# Patient Record
Sex: MALE | Race: WHITE | NOT HISPANIC OR LATINO | URBAN - METROPOLITAN AREA
[De-identification: names, ages, dates, MRNs, and addresses within clinical notes are randomized per-mention and may not be internally consistent; named-entity substitution may affect disease eponyms.]

---

## 2019-09-27 ENCOUNTER — APPOINTMENT (RX ONLY)
Dept: URBAN - METROPOLITAN AREA CLINIC 161 | Facility: CLINIC | Age: 59
Setting detail: DERMATOLOGY
End: 2019-09-27

## 2019-09-27 DIAGNOSIS — L81.4 OTHER MELANIN HYPERPIGMENTATION: ICD-10-CM

## 2019-09-27 DIAGNOSIS — L29.89 OTHER PRURITUS: ICD-10-CM

## 2019-09-27 DIAGNOSIS — L29.8 OTHER PRURITUS: ICD-10-CM

## 2019-09-27 DIAGNOSIS — L81.5 LEUKODERMA, NOT ELSEWHERE CLASSIFIED: ICD-10-CM

## 2019-09-27 DIAGNOSIS — D18.0 HEMANGIOMA: ICD-10-CM

## 2019-09-27 DIAGNOSIS — L81.0 POSTINFLAMMATORY HYPERPIGMENTATION: ICD-10-CM

## 2019-09-27 DIAGNOSIS — L72.0 EPIDERMAL CYST: ICD-10-CM

## 2019-09-27 DIAGNOSIS — D22 MELANOCYTIC NEVI: ICD-10-CM

## 2019-09-27 DIAGNOSIS — L85.3 XEROSIS CUTIS: ICD-10-CM

## 2019-09-27 DIAGNOSIS — Z71.89 OTHER SPECIFIED COUNSELING: ICD-10-CM

## 2019-09-27 DIAGNOSIS — L28.1 PRURIGO NODULARIS: ICD-10-CM

## 2019-09-27 DIAGNOSIS — I78.8 OTHER DISEASES OF CAPILLARIES: ICD-10-CM

## 2019-09-27 DIAGNOSIS — L73.8 OTHER SPECIFIED FOLLICULAR DISORDERS: ICD-10-CM

## 2019-09-27 PROBLEM — H91.90 UNSPECIFIED HEARING LOSS, UNSPECIFIED EAR: Status: ACTIVE | Noted: 2019-09-27

## 2019-09-27 PROBLEM — D18.01 HEMANGIOMA OF SKIN AND SUBCUTANEOUS TISSUE: Status: ACTIVE | Noted: 2019-09-27

## 2019-09-27 PROBLEM — D22.9 MELANOCYTIC NEVI, UNSPECIFIED: Status: ACTIVE | Noted: 2019-09-27

## 2019-09-27 PROBLEM — D48.5 NEOPLASM OF UNCERTAIN BEHAVIOR OF SKIN: Status: ACTIVE | Noted: 2019-09-27

## 2019-09-27 PROCEDURE — ? TREATMENT REGIMEN

## 2019-09-27 PROCEDURE — ? COUNSELING

## 2019-09-27 PROCEDURE — ? FULL BODY SKIN EXAM

## 2019-09-27 PROCEDURE — 11102 TANGNTL BX SKIN SINGLE LES: CPT

## 2019-09-27 PROCEDURE — ? PRESCRIPTION

## 2019-09-27 PROCEDURE — 11103 TANGNTL BX SKIN EA SEP/ADDL: CPT

## 2019-09-27 PROCEDURE — ? RECOMMENDATIONS

## 2019-09-27 PROCEDURE — ? BIOPSY BY SHAVE METHOD

## 2019-09-27 PROCEDURE — 99203 OFFICE O/P NEW LOW 30 MIN: CPT | Mod: 25

## 2019-09-27 RX ORDER — CLOBETASOL PROPIONATE 0.5 MG/G
CREAM TOPICAL BID
Qty: 1 | Refills: 3 | Status: ACTIVE

## 2019-09-27 RX ORDER — MUPIROCIN 20 MG/G
OINTMENT TOPICAL
Qty: 1 | Refills: 1 | COMMUNITY
Start: 2019-09-27

## 2019-09-27 RX ORDER — CETIRIZINE HYDROCHLORIDE 10 MG/1
TABLET, FILM COATED ORAL QAM
Qty: 30 | Refills: 3 | COMMUNITY
Start: 2019-09-27

## 2019-09-27 RX ADMIN — CETIRIZINE HYDROCHLORIDE: 10 TABLET, FILM COATED ORAL at 13:08

## 2019-09-27 RX ADMIN — MUPIROCIN: 20 OINTMENT TOPICAL at 13:08

## 2019-09-27 ASSESSMENT — LOCATION SIMPLE DESCRIPTION DERM
LOCATION SIMPLE: LEFT FOREHEAD
LOCATION SIMPLE: LEFT UPPER BACK
LOCATION SIMPLE: LEFT FOREARM
LOCATION SIMPLE: RIGHT FOREARM

## 2019-09-27 ASSESSMENT — LOCATION DETAILED DESCRIPTION DERM
LOCATION DETAILED: LEFT FOREHEAD
LOCATION DETAILED: LEFT PROXIMAL DORSAL FOREARM
LOCATION DETAILED: RIGHT PROXIMAL RADIAL DORSAL FOREARM
LOCATION DETAILED: LEFT SUPERIOR MEDIAL UPPER BACK

## 2019-09-27 ASSESSMENT — LOCATION ZONE DERM
LOCATION ZONE: FACE
LOCATION ZONE: TRUNK
LOCATION ZONE: ARM

## 2019-09-27 NOTE — PROCEDURE: TREATMENT REGIMEN
Detail Level: Zone
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

## 2019-09-27 NOTE — PROCEDURE: MIPS QUALITY
Quality 47: Advance Care Plan: Advance Care Planning discussed and documented; advance care plan or surrogate decision maker documented in the medical record.
Quality 226: Preventive Care And Screening: Tobacco Use: Screening And Cessation Intervention: Patient screened for tobacco use and is an ex/non-smoker
Quality 130: Documentation Of Current Medications In The Medical Record: Current Medications Documented
Detail Level: Detailed
Quality 110: Preventive Care And Screening: Influenza Immunization: Influenza Immunization Administered during Influenza season
Quality 431: Preventive Care And Screening: Unhealthy Alcohol Use - Screening: Patient screened for unhealthy alcohol use using a single question and scores less than 2 times per year